# Patient Record
Sex: FEMALE | Race: WHITE | Employment: FULL TIME
[De-identification: names, ages, dates, MRNs, and addresses within clinical notes are randomized per-mention and may not be internally consistent; named-entity substitution may affect disease eponyms.]

---

## 2017-08-27 ENCOUNTER — HEALTH MAINTENANCE LETTER (OUTPATIENT)
Age: 42
End: 2017-08-27

## 2018-03-10 DIAGNOSIS — G43.719 INTRACTABLE CHRONIC MIGRAINE WITHOUT AURA AND WITHOUT STATUS MIGRAINOSUS: ICD-10-CM

## 2018-03-12 NOTE — TELEPHONE ENCOUNTER
"Routing refill request to provider for review/approval because:  Patient needs to be seen because it has been more than 1 year since last office visit.  -Last office visit: 11/3/15      Dr. Torres-Please sign if agree.    Team coordinators-Please contact patient to schedule annual office visit.        Last Written Prescription Date:  7/28/16  Last Fill Quantity: 18,  # refills: 1   Last office visit: 11/3/2015 with prescribing provider:  Dr. Antonio   Future Office Visit:      Requested Prescriptions   Pending Prescriptions Disp Refills     SUMAtriptan (IMITREX) 100 MG tablet [Pharmacy Med Name: SUMATRIPTAN 100MG TABLETS] 18 tablet 0     Sig: TAKE 1 TABLET BY MOUTH AT ONSET OF HEADACHE. MAY REPEAT IN 2 HOURS IF NEEDED. MAX DOSE OF 2 TABLETS PER DAY    Serotonin Agonists Failed    3/10/2018 12:24 PM       Failed - Blood pressure under 140/90 in past 12 months    BP Readings from Last 3 Encounters:   11/03/15 116/88   04/16/15 110/70   10/29/14 112/76                Failed - Serotonin Agonist request needs review.    Please review patient's record. If patient has had 8 or more treatments in the past month, please forward to provider.         Failed - Recent (12 mo) or future (30 days) visit within the authorizing provider's specialty    Patient had office visit in the last 12 months or has a visit in the next 30 days with authorizing provider or within the authorizing provider's specialty.  See \"Patient Info\" tab in inbasket, or \"Choose Columns\" in Meds & Orders section of the refill encounter.           Passed - Patient is age 18 or older       Passed - No active pregnancy on record       Passed - No positive pregnancy test in past 12 months          "

## 2018-03-15 RX ORDER — SUMATRIPTAN 100 MG/1
TABLET, FILM COATED ORAL
Qty: 9 TABLET | Refills: 0 | Status: SHIPPED | OUTPATIENT
Start: 2018-03-15

## 2019-11-07 ENCOUNTER — HEALTH MAINTENANCE LETTER (OUTPATIENT)
Age: 44
End: 2019-11-07

## 2020-11-29 ENCOUNTER — HEALTH MAINTENANCE LETTER (OUTPATIENT)
Age: 45
End: 2020-11-29

## 2021-04-10 ENCOUNTER — HEALTH MAINTENANCE LETTER (OUTPATIENT)
Age: 46
End: 2021-04-10

## 2021-09-25 ENCOUNTER — HEALTH MAINTENANCE LETTER (OUTPATIENT)
Age: 46
End: 2021-09-25

## 2022-01-15 ENCOUNTER — HEALTH MAINTENANCE LETTER (OUTPATIENT)
Age: 47
End: 2022-01-15

## 2022-05-01 ENCOUNTER — HEALTH MAINTENANCE LETTER (OUTPATIENT)
Age: 47
End: 2022-05-01

## 2022-12-26 ENCOUNTER — HEALTH MAINTENANCE LETTER (OUTPATIENT)
Age: 47
End: 2022-12-26

## 2023-04-16 ENCOUNTER — HEALTH MAINTENANCE LETTER (OUTPATIENT)
Age: 48
End: 2023-04-16

## 2023-06-02 ENCOUNTER — HEALTH MAINTENANCE LETTER (OUTPATIENT)
Age: 48
End: 2023-06-02

## 2024-01-30 ENCOUNTER — OFFICE VISIT (OUTPATIENT)
Dept: URGENT CARE | Facility: URGENT CARE | Age: 49
End: 2024-01-30
Payer: OTHER MISCELLANEOUS

## 2024-01-30 VITALS
OXYGEN SATURATION: 97 % | DIASTOLIC BLOOD PRESSURE: 89 MMHG | TEMPERATURE: 97.6 F | BODY MASS INDEX: 31.5 KG/M2 | SYSTOLIC BLOOD PRESSURE: 149 MMHG | WEIGHT: 220 LBS | HEIGHT: 70 IN | HEART RATE: 68 BPM

## 2024-01-30 DIAGNOSIS — Z57.8 EMPLOYEE EXPOSURE TO BLOOD: Primary | ICD-10-CM

## 2024-01-30 PROCEDURE — 87522 HEPATITIS C REVRS TRNSCRPJ: CPT | Performed by: PHYSICIAN ASSISTANT

## 2024-01-30 PROCEDURE — 99202 OFFICE O/P NEW SF 15 MIN: CPT | Performed by: PHYSICIAN ASSISTANT

## 2024-01-30 PROCEDURE — 86704 HEP B CORE ANTIBODY TOTAL: CPT | Performed by: PHYSICIAN ASSISTANT

## 2024-01-30 PROCEDURE — 87340 HEPATITIS B SURFACE AG IA: CPT | Performed by: PHYSICIAN ASSISTANT

## 2024-01-30 PROCEDURE — 36415 COLL VENOUS BLD VENIPUNCTURE: CPT | Performed by: PHYSICIAN ASSISTANT

## 2024-01-30 PROCEDURE — 86706 HEP B SURFACE ANTIBODY: CPT | Performed by: PHYSICIAN ASSISTANT

## 2024-01-30 PROCEDURE — 87389 HIV-1 AG W/HIV-1&-2 AB AG IA: CPT | Performed by: PHYSICIAN ASSISTANT

## 2024-01-30 PROCEDURE — 86803 HEPATITIS C AB TEST: CPT | Performed by: PHYSICIAN ASSISTANT

## 2024-01-30 RX ORDER — VENLAFAXINE HYDROCHLORIDE 75 MG/1
75 CAPSULE, EXTENDED RELEASE ORAL
COMMUNITY
Start: 2023-12-14

## 2024-01-30 SDOH — HEALTH STABILITY - PHYSICAL HEALTH: OCCUPATIONAL EXPOSURE TO OTHER RISK FACTORS: Z57.8
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
## 2024-01-31 LAB
HBV CORE AB SERPL QL IA: NONREACTIVE
HBV SURFACE AB SERPL IA-ACNC: <3.5 M[IU]/ML
HBV SURFACE AB SERPL IA-ACNC: NONREACTIVE M[IU]/ML
HBV SURFACE AG SERPL QL IA: NONREACTIVE
HIV 1+2 AB+HIV1 P24 AG SERPL QL IA: NONREACTIVE

## 2024-02-01 LAB
HCV AB SERPL QL IA: NONREACTIVE
HCV RNA SERPL NAA+PROBE-ACNC: NOT DETECTED IU/ML

## 2024-06-23 ENCOUNTER — HEALTH MAINTENANCE LETTER (OUTPATIENT)
Age: 49
End: 2024-06-23

## 2025-06-21 ENCOUNTER — HEALTH MAINTENANCE LETTER (OUTPATIENT)
Age: 50
End: 2025-06-21

## 2025-07-12 ENCOUNTER — HEALTH MAINTENANCE LETTER (OUTPATIENT)
Age: 50
End: 2025-07-12